# Patient Record
Sex: FEMALE | Race: WHITE
[De-identification: names, ages, dates, MRNs, and addresses within clinical notes are randomized per-mention and may not be internally consistent; named-entity substitution may affect disease eponyms.]

---

## 2017-01-11 PROBLEM — Z00.00 ENCOUNTER FOR PREVENTIVE HEALTH EXAMINATION: Status: ACTIVE | Noted: 2017-01-11

## 2017-01-23 ENCOUNTER — APPOINTMENT (OUTPATIENT)
Dept: NEUROLOGY | Facility: CLINIC | Age: 82
End: 2017-01-23

## 2017-01-23 VITALS
WEIGHT: 135 LBS | DIASTOLIC BLOOD PRESSURE: 65 MMHG | HEIGHT: 61 IN | BODY MASS INDEX: 25.49 KG/M2 | SYSTOLIC BLOOD PRESSURE: 147 MMHG | OXYGEN SATURATION: 78 %

## 2017-01-23 VITALS — HEART RATE: 56 BPM

## 2017-01-23 DIAGNOSIS — Z86.73 PERSONAL HISTORY OF TRANSIENT ISCHEMIC ATTACK (TIA), AND CEREBRAL INFARCTION W/OUT RESIDUAL DEFICITS: ICD-10-CM

## 2017-01-26 PROBLEM — Z86.73 CEREBRAL INFARCTION, CHRONIC: Status: ACTIVE | Noted: 2017-01-26

## 2017-05-05 ENCOUNTER — APPOINTMENT (OUTPATIENT)
Dept: NEUROLOGY | Facility: CLINIC | Age: 82
End: 2017-05-05

## 2017-05-11 ENCOUNTER — APPOINTMENT (OUTPATIENT)
Dept: NEUROLOGY | Facility: CLINIC | Age: 82
End: 2017-05-11

## 2017-05-14 RX ORDER — OSELTAMIVIR PHOSPHATE 75 MG/1
75 CAPSULE ORAL
Qty: 10 | Refills: 0 | Status: ACTIVE | COMMUNITY
Start: 2017-01-16

## 2017-05-14 RX ORDER — TRAZODONE HYDROCHLORIDE 50 MG/1
50 TABLET ORAL
Qty: 30 | Refills: 0 | Status: ACTIVE | COMMUNITY
Start: 2016-12-19

## 2017-05-14 RX ORDER — CHOLECALCIFEROL (VITAMIN D3) 25 MCG
TABLET ORAL
Refills: 0 | Status: ACTIVE | COMMUNITY

## 2017-05-14 RX ORDER — LEVOTHYROXINE SODIUM 0.07 MG/1
75 TABLET ORAL
Refills: 0 | Status: ACTIVE | COMMUNITY

## 2017-05-14 RX ORDER — GABAPENTIN 100 MG/1
100 CAPSULE ORAL
Refills: 0 | Status: ACTIVE | COMMUNITY

## 2017-05-14 RX ORDER — RANITIDINE HYDROCHLORIDE 150 MG/1
150 TABLET, FILM COATED ORAL
Refills: 0 | Status: ACTIVE | COMMUNITY

## 2017-05-14 RX ORDER — CLOTRIMAZOLE AND BETAMETHASONE DIPROPIONATE 10; .5 MG/G; MG/G
1-0.05 CREAM TOPICAL
Qty: 90 | Refills: 0 | Status: ACTIVE | COMMUNITY
Start: 2016-10-14

## 2017-05-14 RX ORDER — CHROMIUM 200 MCG
TABLET ORAL
Refills: 0 | Status: ACTIVE | COMMUNITY

## 2017-05-14 RX ORDER — RIVAROXABAN 20 MG/1
20 TABLET, FILM COATED ORAL
Qty: 30 | Refills: 0 | Status: ACTIVE | COMMUNITY
Start: 2017-02-06

## 2017-05-14 RX ORDER — CITALOPRAM HYDROBROMIDE 10 MG/1
10 TABLET, FILM COATED ORAL DAILY
Refills: 0 | Status: ACTIVE | COMMUNITY

## 2017-05-14 RX ORDER — MELOXICAM 7.5 MG/1
7.5 TABLET ORAL DAILY
Refills: 0 | Status: ACTIVE | COMMUNITY

## 2017-08-25 ENCOUNTER — APPOINTMENT (OUTPATIENT)
Dept: NEUROLOGY | Facility: CLINIC | Age: 82
End: 2017-08-25
Payer: MEDICARE

## 2017-08-25 VITALS
HEART RATE: 59 BPM | OXYGEN SATURATION: 99 % | DIASTOLIC BLOOD PRESSURE: 56 MMHG | BODY MASS INDEX: 24.84 KG/M2 | HEIGHT: 62 IN | WEIGHT: 135 LBS | SYSTOLIC BLOOD PRESSURE: 106 MMHG

## 2017-08-25 PROCEDURE — 64642 CHEMODENERV 1 EXTREMITY 1-4: CPT

## 2017-08-25 PROCEDURE — 95874 GUIDE NERV DESTR NEEDLE EMG: CPT

## 2018-03-16 ENCOUNTER — APPOINTMENT (OUTPATIENT)
Dept: NEUROLOGY | Facility: CLINIC | Age: 83
End: 2018-03-16
Payer: MEDICARE

## 2018-03-16 VITALS — OXYGEN SATURATION: 84 % | DIASTOLIC BLOOD PRESSURE: 67 MMHG | SYSTOLIC BLOOD PRESSURE: 155 MMHG

## 2018-03-16 DIAGNOSIS — R46.89 OTHER SYMPTOMS AND SIGNS INVOLVING COGNITIVE FUNCTIONS AND AWARENESS: ICD-10-CM

## 2018-03-16 DIAGNOSIS — R41.89 OTHER SYMPTOMS AND SIGNS INVOLVING COGNITIVE FUNCTIONS AND AWARENESS: ICD-10-CM

## 2018-03-16 PROCEDURE — 64642 CHEMODENERV 1 EXTREMITY 1-4: CPT

## 2018-03-16 RX ORDER — DONEPEZIL HYDROCHLORIDE 5 MG/1
5 TABLET ORAL AT BEDTIME
Qty: 60 | Refills: 5 | Status: ACTIVE | COMMUNITY

## 2018-03-16 RX ORDER — SIMVASTATIN 10 MG/1
10 TABLET, FILM COATED ORAL
Refills: 0 | Status: DISCONTINUED | COMMUNITY
End: 2018-03-16

## 2018-06-14 ENCOUNTER — APPOINTMENT (OUTPATIENT)
Dept: NEUROLOGY | Facility: CLINIC | Age: 83
End: 2018-06-14

## 2018-07-22 PROBLEM — Z86.73 HISTORY OF STROKE: Status: RESOLVED | Noted: 2017-01-23 | Resolved: 2018-07-22

## 2018-07-23 ENCOUNTER — APPOINTMENT (OUTPATIENT)
Dept: NEUROLOGY | Facility: CLINIC | Age: 83
End: 2018-07-23
Payer: MEDICARE

## 2018-07-23 VITALS
OXYGEN SATURATION: 82 % | HEIGHT: 62 IN | WEIGHT: 135 LBS | DIASTOLIC BLOOD PRESSURE: 72 MMHG | HEART RATE: 56 BPM | SYSTOLIC BLOOD PRESSURE: 143 MMHG | BODY MASS INDEX: 24.84 KG/M2

## 2018-07-23 PROCEDURE — 64642 CHEMODENERV 1 EXTREMITY 1-4: CPT

## 2018-07-23 PROCEDURE — 95874 GUIDE NERV DESTR NEEDLE EMG: CPT

## 2019-03-07 ENCOUNTER — APPOINTMENT (OUTPATIENT)
Dept: NEUROLOGY | Facility: CLINIC | Age: 84
End: 2019-03-07
Payer: MEDICARE

## 2019-03-07 VITALS — OXYGEN SATURATION: 80 % | BODY MASS INDEX: 24.84 KG/M2 | HEIGHT: 62 IN | HEART RATE: 55 BPM | WEIGHT: 135 LBS

## 2019-03-07 DIAGNOSIS — G81.10 SPASTIC HEMIPLEGIA AFFECTING UNSPECIFIED SIDE: ICD-10-CM

## 2019-03-07 PROCEDURE — 64642 CHEMODENERV 1 EXTREMITY 1-4: CPT

## 2019-03-07 RX ORDER — CIPROFLOXACIN HYDROCHLORIDE 500 MG/1
500 TABLET, FILM COATED ORAL
Qty: 10 | Refills: 0 | Status: COMPLETED | COMMUNITY
Start: 2016-11-27 | End: 2019-03-07

## 2019-03-07 RX ORDER — RIVAROXABAN 15 MG-20MG
15 & 20 KIT ORAL
Refills: 0 | Status: DISCONTINUED | COMMUNITY
End: 2019-03-07

## 2019-03-07 RX ORDER — NITROFURANTOIN (MONOHYDRATE/MACROCRYSTALS) 25; 75 MG/1; MG/1
100 CAPSULE ORAL
Qty: 10 | Refills: 0 | Status: DISCONTINUED | COMMUNITY
Start: 2016-12-09 | End: 2019-03-07

## 2019-03-07 RX ORDER — MELOXICAM 15 MG/1
15 TABLET ORAL
Qty: 30 | Refills: 0 | Status: DISCONTINUED | COMMUNITY
Start: 2016-07-06 | End: 2019-03-07

## 2019-03-07 RX ORDER — SILVER SULFADIAZINE 10 MG/G
1 CREAM TOPICAL TWICE DAILY
Qty: 1000 | Refills: 3 | Status: ACTIVE | COMMUNITY
Start: 2019-03-07 | End: 1900-01-01

## 2019-03-07 RX ORDER — LOSARTAN POTASSIUM 100 MG/1
TABLET, FILM COATED ORAL
Refills: 0 | Status: DISCONTINUED | COMMUNITY
End: 2019-03-07

## 2019-03-07 RX ORDER — SIMVASTATIN 20 MG/1
20 TABLET, FILM COATED ORAL
Qty: 30 | Refills: 0 | Status: DISCONTINUED | COMMUNITY
Start: 2016-05-11 | End: 2019-03-07

## 2019-03-08 PROBLEM — G81.10 SPASTIC HEMIPLEGIA, DOMINANT SIDE: Status: ACTIVE | Noted: 2017-01-26

## 2019-03-08 NOTE — PROCEDURE
[FreeTextEntry1] : Botox for Right spastic hemiplegia [FreeTextEntry2] : Spasticity [FreeTextEntry4] : ethyl chloride [FreeTextEntry3] : Patient with good response to last treatment with reduction of pain\par Patient's daughter  consented with explanation of risks and benefits including black box warnings and explanation of alternative treatments. \par \par Arm  positioned to expose injection sites and prepped with alcohol and ethyl chloride. \par \par R    Bicep 40 units\par R   Flexor Digitorum Profundus   40\par R   FDS  20 units\par \par \par \par \par  \par \par \par Total used 100 units Onabotulinum toxin\par Total wasted = 0\par Total billable = 100 units\par \par Patient tolerated procedure well\par